# Patient Record
Sex: MALE | Race: BLACK OR AFRICAN AMERICAN | NOT HISPANIC OR LATINO | Employment: STUDENT | URBAN - METROPOLITAN AREA
[De-identification: names, ages, dates, MRNs, and addresses within clinical notes are randomized per-mention and may not be internally consistent; named-entity substitution may affect disease eponyms.]

---

## 2023-03-09 ENCOUNTER — PATIENT MESSAGE (OUTPATIENT)
Dept: RESEARCH | Facility: HOSPITAL | Age: 18
End: 2023-03-09
Payer: MEDICAID

## 2023-07-20 ENCOUNTER — HOSPITAL ENCOUNTER (EMERGENCY)
Facility: HOSPITAL | Age: 18
Discharge: HOME | End: 2023-07-20
Attending: EMERGENCY MEDICINE
Payer: MEDICAID

## 2023-07-20 VITALS
DIASTOLIC BLOOD PRESSURE: 54 MMHG | OXYGEN SATURATION: 97 % | SYSTOLIC BLOOD PRESSURE: 96 MMHG | RESPIRATION RATE: 14 BRPM | HEART RATE: 81 BPM | BODY MASS INDEX: 20.72 KG/M2 | WEIGHT: 132 LBS | HEIGHT: 67 IN | TEMPERATURE: 98 F

## 2023-07-20 DIAGNOSIS — J06.9 UPPER RESPIRATORY TRACT INFECTION, UNSPECIFIED TYPE: Primary | ICD-10-CM

## 2023-07-20 PROCEDURE — 99282 EMERGENCY DEPT VISIT SF MDM: CPT

## 2023-07-20 NOTE — Clinical Note
"Isaac Angeles" Falls was seen and treated in our emergency department on 7/20/2023.  He may return to work on 07/21/2023.       If you have any questions or concerns, please don't hesitate to call.      Chente Nash RN    "

## 2023-07-21 NOTE — DISCHARGE INSTRUCTIONS

## 2023-07-21 NOTE — ED NOTES
Patient arrived to the ED due to having concerns of the possibility of having Covid-19. Patient is having symptoms of headaches, weakness, nausea and body aches. Patient is alert and oriented x 4. Denies chest pain.

## 2023-07-21 NOTE — ED PROVIDER NOTES
Encounter Date: 7/20/2023       History     Chief Complaint   Patient presents with    COVID-19 Concerns     Pt c/o nausea, body aches.  Wants covid test     18-year-old male to the ER requesting a COVID test for upper respiratory infectious symptoms.  States that he has body aches.  Family member has COVID.  He had a negative COVID test yesterday at outside facility.  Vital signs normal, no distress noted, pulse ox 97%.  Patient is young, otherwise healthy.    Review of patient's allergies indicates:  No Known Allergies  No past medical history on file.  No past surgical history on file.  No family history on file.     Review of Systems   Constitutional:  Positive for chills, fatigue and fever.   HENT:  Negative for sore throat.    Respiratory:  Negative for shortness of breath.    Cardiovascular:  Negative for chest pain.   Gastrointestinal:  Negative for nausea.   Genitourinary:  Negative for dysuria.   Musculoskeletal:  Negative for back pain.   Skin:  Negative for rash.   Neurological:  Negative for weakness.   Hematological:  Does not bruise/bleed easily.     Physical Exam     Initial Vitals [07/20/23 1853]   BP Pulse Resp Temp SpO2   (!) 96/54 81 14 98.2 °F (36.8 °C) 97 %      MAP       --         Physical Exam    Constitutional: Vital signs are normal. He appears well-developed and well-nourished.   HENT:   Head: Normocephalic and atraumatic.   Right Ear: Hearing normal.   Left Ear: Hearing normal.   Normal exam   Eyes: Conjunctivae are normal.   Cardiovascular:  Normal rate and regular rhythm.           Pulmonary/Chest:   Even and unlabored   Abdominal: Abdomen is soft. Bowel sounds are normal.   Musculoskeletal:         General: Normal range of motion.     Neurological: He is alert and oriented to person, place, and time.   Skin: Skin is warm and intact.   Psychiatric: He has a normal mood and affect. His speech is normal and behavior is normal. Cognition and memory are normal.       ED Course    Procedures  Labs Reviewed   HIV 1 / 2 ANTIBODY   HEPATITIS C ANTIBODY   SARS-COV-2 (COVID-19) QUALITATIVE PCR          Imaging Results    None          Medications - No data to display  Medical Decision Making:   History:   Old Medical Records: I decided to obtain old medical records.  Old Records Summarized: records from clinic visits.  Initial Assessment:   18-year-old male with upper respiratory symptoms requesting a COVID test  Differential Diagnosis:   Upper respiratory infection, acute nasal pharyngitis, COVID  Clinical Tests:   Lab Tests: Ordered  ED Management:  Plan:   Otherwise healthy young male with upper respiratory infectious symptoms requesting a COVID test.  He had a negative COVID test yesterday  His physical exam and vital signs are reassuring.  A COVID test was performed.  He was advised he would get the results on the judith.  I considered but I am not concerned about acute pneumonia, respiratory infection, or sepsis.  The patient likely has a mild upper respiratory infection and can be managed with over-the-counter supportive care.  Return precautions given.  Stable for discharge                        Clinical Impression:   Final diagnoses:  [J06.9] Upper respiratory tract infection, unspecified type (Primary)        ED Disposition Condition    Discharge Stable          ED Prescriptions    None       Follow-up Information    None          Hardik Fonseca PA-C  07/20/23 1925

## 2025-05-16 ENCOUNTER — TELEPHONE (OUTPATIENT)
Facility: CLINIC | Age: 20
End: 2025-05-16

## 2025-05-16 NOTE — TELEPHONE ENCOUNTER
----- Message from Savanah sent at 5/14/2025  2:36 PM CDT -----  Regarding: Appt Access  Contact: BRANDI GANN JR. [[id number]]  SCHEDULING/REQUESTAppt Type: NPDate/Time Preference: 09/25/2025Treating Provider: Delmi Name:  BRANDI GANN JR. [[id number]]Contact Preference:  [phone]/Notes: NP is requesting a sooner appt.  No earlier appts are avail.  Appt is added to waitlist.  Pt is stating he is experiencing headaches and would like to be seen.  Please call.

## 2025-05-22 ENCOUNTER — OFFICE VISIT (OUTPATIENT)
Facility: CLINIC | Age: 20
End: 2025-05-22

## 2025-05-22 VITALS — BODY MASS INDEX: 20.41 KG/M2 | HEIGHT: 67 IN | WEIGHT: 130.06 LBS

## 2025-05-22 DIAGNOSIS — G43.009 MIGRAINE WITHOUT AURA AND WITHOUT STATUS MIGRAINOSUS, NOT INTRACTABLE: Primary | ICD-10-CM

## 2025-05-22 DIAGNOSIS — M54.2 NECK PAIN: ICD-10-CM

## 2025-05-22 DIAGNOSIS — G44.209 ACUTE NON INTRACTABLE TENSION-TYPE HEADACHE: ICD-10-CM

## 2025-05-22 PROCEDURE — 99214 OFFICE O/P EST MOD 30 MIN: CPT | Mod: PBBFAC,PN | Performed by: STUDENT IN AN ORGANIZED HEALTH CARE EDUCATION/TRAINING PROGRAM

## 2025-05-22 PROCEDURE — 99999 PR PBB SHADOW E&M-EST. PATIENT-LVL IV: CPT | Mod: PBBFAC,,, | Performed by: STUDENT IN AN ORGANIZED HEALTH CARE EDUCATION/TRAINING PROGRAM

## 2025-05-22 RX ORDER — RIZATRIPTAN BENZOATE 10 MG/1
10 TABLET ORAL
Qty: 10 TABLET | Refills: 3 | Status: SHIPPED | OUTPATIENT
Start: 2025-05-22 | End: 2025-06-21

## 2025-05-22 RX ORDER — PROPRANOLOL HYDROCHLORIDE 20 MG/1
20 TABLET ORAL 2 TIMES DAILY
Qty: 60 TABLET | Refills: 11 | Status: SHIPPED | OUTPATIENT
Start: 2025-05-22 | End: 2026-05-22

## 2025-05-22 NOTE — PROGRESS NOTES
[facility] - NEUROLOGY  [facility], [place]    Date: 5/22/25  Patient Name: Isaac Dietz Jr.   MRN: [id number]   PCP: Nga, Primary Doctor  Referring Provider: Marleen Mejia MD    Assessment:   Isaac Dietz Jr. is a 20 y.o. male presenting for headaches. Case most consistent with but not limited to migraines. He meets all criteria for migraines except for quality of the pain. He has been taking ibuprofen daily so we spoke about the risk of medication overuse headaches. We will use propranolol for prevention, and maxalt for attacks. We spoke about headache hygiene. I recommended to tries the methocarbamol he was prescribed for this back and neck pain as well. I didn't see any obvious signs of myelopath or radiculopathy coming from the neck but he does have some neck tenderness and mild limited range of movement. This can trigger/worsen headaches. I am sending PT for the neck. We will plan a follow up in 3 months. He verbalized understanding and agreed with the plan.     Plan:     - Propranolol 20 mg BID   - Maxalt for attacks  - Headache hygiene   - Methocarbamol PRN  - PT of the neck   - Follow up in 3 months.     Problem List Items Addressed This Visit          Neuro    Migraine without aura and without status migrainosus, not intractable - Primary       Orthopedic    Neck pain     Other Visit Diagnoses         Acute non intractable tension-type headache        Relevant Medications    propranoloL (INDERAL) 20 MG tablet    rizatriptan (MAXALT) 10 MG tablet    Other Relevant Orders    Ambulatory referral/consult to Physical/Occupational Therapy          Phil Rey MD    Subjective:   Patient seen in consultation at the request of Marleen Mejia MD for the evaluation of headaches. A copy of this note will be sent to the referring physician.      HPI 5/22/25:   Mr. Isaac Dietz Jr. is a 20 y.o. male presenting for headaches. He has a history of allergies, had asthma as a child. He stated  "that he has been having headaches at least for at least 1 month. He denied any head trauma, accident or new medication started around the time of onset. No family history of headaches or migraines. He is having daily headaches now. He is taking 1 ibuprofen 600 mg a day. At onset is mild/moderate, starts on the R side of his posterior head, slowly worsen. They can get severe. Photophobia and phonophobia. Yes nausea. No vomiting. Exertion worsens the headache. Headaches last > 4 hours. He denied any changes of his headaches with changes of head position, valsalva, or any other neurological symptoms. He has not recently gained or lost a significant amount of weight. Denied any new recreational drug use or any possible triggers.     PAST MEDICAL HISTORY:  History reviewed. No pertinent past medical history.    PAST SURGICAL HISTORY:  History reviewed. No pertinent surgical history.    CURRENT MEDS:  Current Medications[1]    ALLERGIES:  Review of patient's allergies indicates:   Allergen Reactions    Benadryl [diphenhydramine hcl] Palpitations     Pt reports he won't accept benadryl IVP d/t his heart racing       FAMILY HISTORY:  No family history on file.    SOCIAL HISTORY:  Social History[2]    Review of Systems:  12 system review of systems is negative except for the symptoms mentioned in HPI.      Objective:     Vitals:    05/22/25 0845   BP: (P) 110/71   BP Location: (P) Left arm   Patient Position: Sitting   Weight: 59 kg (130 lb 1.1 oz)   Height: 5' 7" (1.702 m)     General: NAD, well nourished   Eyes: no tearing, discharge, no erythema   ENT: moist mucous membranes of the oral cavity, nares patent    Neck: Supple, full range of motion  Cardiovascular: Warm and well perfused, pulses equal and symmetrical  Lungs: Normal work of breathing, normal chest wall excursions  Skin: No rash, lesions, or breakdown on exposed skin  Psychiatry: Mood and affect are appropriate   Abdomen: soft, non tender, non " distended  Extremeties: No cyanosis, clubbing or edema.    Neurological   MENTAL STATUS: Alert and oriented to person, place, and time. Attention and concentration within normal limits. Speech without dysarthria, able to name and repeat without difficulty. Recent and remote memory within normal limits   CRANIAL NERVES: Visual fields intact. PERRL. EOMI. Facial sensation intact. Face symmetrical. Hearing grossly intact. Full shoulder shrug bilaterally. Tongue protrudes midline   SENSORY: Sensation is intact to pin throughout.  Joint position perception intact. Negative Romberg.   MOTOR: Normal bulk and tone. No pronator drift.  5/5 deltoid, biceps, triceps, interosseous, hand  bilaterally. 5/5 iliopsoas, knee extension/flexion, foot dorsi/plantarflexion bilaterally.    REFLEXES: Symmetric and 2+ throughout. Toes down going bilaterally.   CEREBELLAR/COORDINATION/GAIT: Gait steady with normal arm swing and stride length.  Heel to shin intact. Finger to nose intact. Normal rapid alternating movements.     I spent a total of 40 minutes on the day of the visit.   This includes face to face time and non-face to face time preparing to see the patient (eg, review of tests), obtaining and/or reviewing separately obtained history, documenting clinical information in the electronic or other health record, independently interpreting results and communicating results to the patient/family/caregiver, or care coordinator.      Phil Rey MD  Neurology           [1]   Current Outpatient Medications   Medication Sig Dispense Refill    cetirizine-pseudoephedrine 5-120 mg Tb12 Take 1 tablet by mouth once daily. for 10 days 30 tablet 0    hydrOXYzine HCL (ATARAX) 25 MG tablet Take 1 tablet (25 mg total) by mouth every 6 (six) hours. 12 tablet 0    ibuprofen (ADVIL,MOTRIN) 600 MG tablet Take 1 tablet (600 mg total) by mouth every 6 (six) hours as needed for Pain. 20 tablet 0    methocarbamoL (ROBAXIN) 750 MG Tab Take 2 tablets  (1,500 mg total) by mouth 4 (four) times daily for 3 days, THEN 1 tablet (750 mg total) 4 (four) times daily for 7 days. 52 tablet 0    naproxen (NAPROSYN) 500 MG tablet Take 1 tablet (500 mg total) by mouth 2 (two) times daily with meals. 30 tablet 0    ondansetron (ZOFRAN-ODT) 4 MG TbDL Take 1 tablet (4 mg total) by mouth 3 (three) times daily. 10 tablet 0    propranoloL (INDERAL) 20 MG tablet Take 1 tablet (20 mg total) by mouth 2 (two) times daily. 60 tablet 11    rizatriptan (MAXALT) 10 MG tablet Take 1 tablet (10 mg total) by mouth as needed for Migraine. 10 tablet 3     No current facility-administered medications for this visit.   [2]   Social History  Tobacco Use    Smoking status: Some Days     Types: Cigarettes    Smokeless tobacco: Never   Substance Use Topics    Alcohol use: Not Currently    Drug use: Yes     Types: Marijuana

## 2025-08-15 ENCOUNTER — OFFICE VISIT (OUTPATIENT)
Facility: CLINIC | Age: 20
End: 2025-08-15
Payer: MEDICAID

## 2025-08-15 VITALS
DIASTOLIC BLOOD PRESSURE: 68 MMHG | BODY MASS INDEX: 19.85 KG/M2 | WEIGHT: 123.5 LBS | HEIGHT: 66 IN | HEART RATE: 77 BPM | SYSTOLIC BLOOD PRESSURE: 108 MMHG

## 2025-08-15 DIAGNOSIS — G43.009 MIGRAINE WITHOUT AURA AND WITHOUT STATUS MIGRAINOSUS, NOT INTRACTABLE: Primary | ICD-10-CM

## 2025-08-15 PROCEDURE — 99213 OFFICE O/P EST LOW 20 MIN: CPT | Mod: PBBFAC,PN | Performed by: STUDENT IN AN ORGANIZED HEALTH CARE EDUCATION/TRAINING PROGRAM

## 2025-08-15 PROCEDURE — 99999 PR PBB SHADOW E&M-EST. PATIENT-LVL III: CPT | Mod: PBBFAC,,, | Performed by: STUDENT IN AN ORGANIZED HEALTH CARE EDUCATION/TRAINING PROGRAM

## 2025-08-16 ENCOUNTER — HOSPITAL ENCOUNTER (OUTPATIENT)
Dept: RADIOLOGY | Facility: HOSPITAL | Age: 20
Discharge: HOME | End: 2025-08-16
Attending: STUDENT IN AN ORGANIZED HEALTH CARE EDUCATION/TRAINING PROGRAM
Payer: COMMERCIAL

## 2025-08-16 DIAGNOSIS — G43.009 MIGRAINE WITHOUT AURA AND WITHOUT STATUS MIGRAINOSUS, NOT INTRACTABLE: ICD-10-CM

## 2025-08-16 PROCEDURE — A9585 GADOBUTROL INJECTION: HCPCS | Performed by: STUDENT IN AN ORGANIZED HEALTH CARE EDUCATION/TRAINING PROGRAM

## 2025-08-16 PROCEDURE — 70553 MRI BRAIN STEM W/O & W/DYE: CPT | Mod: 26,,, | Performed by: RADIOLOGY

## 2025-08-16 PROCEDURE — 25500020 PHARM REV CODE 255: Performed by: STUDENT IN AN ORGANIZED HEALTH CARE EDUCATION/TRAINING PROGRAM

## 2025-08-16 PROCEDURE — 70553 MRI BRAIN STEM W/O & W/DYE: CPT | Mod: TC

## 2025-08-16 RX ORDER — GADOBUTROL 604.72 MG/ML
5 INJECTION INTRAVENOUS
Status: COMPLETED | OUTPATIENT
Start: 2025-08-16 | End: 2025-08-16

## 2025-08-16 RX ADMIN — GADOBUTROL 5 ML: 604.72 INJECTION INTRAVENOUS at 07:08

## 2025-08-17 ENCOUNTER — PATIENT MESSAGE (OUTPATIENT)
Facility: CLINIC | Age: 20
End: 2025-08-17
Payer: COMMERCIAL